# Patient Record
Sex: FEMALE
[De-identification: names, ages, dates, MRNs, and addresses within clinical notes are randomized per-mention and may not be internally consistent; named-entity substitution may affect disease eponyms.]

---

## 2018-03-18 ENCOUNTER — HOSPITAL ENCOUNTER (EMERGENCY)
Dept: HOSPITAL 31 - C.ER | Age: 33
Discharge: HOME | End: 2018-03-18
Payer: COMMERCIAL

## 2018-03-18 VITALS
RESPIRATION RATE: 18 BRPM | TEMPERATURE: 98.5 F | DIASTOLIC BLOOD PRESSURE: 62 MMHG | HEART RATE: 53 BPM | SYSTOLIC BLOOD PRESSURE: 112 MMHG

## 2018-03-18 DIAGNOSIS — O03.9: Primary | ICD-10-CM

## 2018-03-18 DIAGNOSIS — D64.9: ICD-10-CM

## 2018-03-18 LAB
BASOPHILS # BLD AUTO: 0.1 K/UL (ref 0–0.2)
BASOPHILS NFR BLD: 0.7 % (ref 0–2)
BILIRUB UR-MCNC: NEGATIVE MG/DL
EOSINOPHIL # BLD AUTO: 0.1 K/UL (ref 0–0.7)
EOSINOPHIL NFR BLD: 1.2 % (ref 0–4)
ERYTHROCYTE [DISTWIDTH] IN BLOOD BY AUTOMATED COUNT: 18.6 % (ref 11.5–14.5)
GLUCOSE UR STRIP-MCNC: NORMAL MG/DL
HCG,QUALITATIVE URINE: POSITIVE
HGB BLD-MCNC: 8.6 G/DL (ref 11–16)
LEUKOCYTE ESTERASE UR-ACNC: (no result) LEU/UL
LYMPHOCYTES # BLD AUTO: 2.6 K/UL (ref 1–4.3)
LYMPHOCYTES NFR BLD AUTO: 33.7 % (ref 20–40)
MCH RBC QN AUTO: 20.4 PG (ref 27–31)
MCHC RBC AUTO-ENTMCNC: 30.8 G/DL (ref 33–37)
MCV RBC AUTO: 66.5 FL (ref 81–99)
MONOCYTES # BLD: 0.8 K/UL (ref 0–0.8)
MONOCYTES NFR BLD: 10.2 % (ref 0–10)
NEUTROPHILS # BLD: 4.1 K/UL (ref 1.8–7)
NEUTROPHILS NFR BLD AUTO: 54.2 % (ref 50–75)
NRBC BLD AUTO-RTO: 0.1 % (ref 0–2)
PH UR STRIP: 5 [PH] (ref 5–8)
PLATELET # BLD: 336 K/UL (ref 130–400)
PMV BLD AUTO: 9.6 FL (ref 7.2–11.7)
PROT UR STRIP-MCNC: (no result) MG/DL
RBC # BLD AUTO: 4.2 MIL/UL (ref 3.8–5.2)
RBC # UR STRIP: (no result) /UL
SP GR UR STRIP: 1.02 (ref 1–1.03)
SQUAMOUS EPITHIAL: 1 /HPF (ref 0–5)
UROBILINOGEN UR-MCNC: NORMAL MG/DL (ref 0.2–1)
WBC # BLD AUTO: 7.6 K/UL (ref 4.8–10.8)

## 2018-03-18 NOTE — US
HISTORY:

vaginal bleeding



COMPARISON:

None available.



TECHNIQUE:

Transabdominal and transvaginal



FINDINGS:



UTERUS:

Measures 9.4 x 4.8 x 6.3 cm. Normal in size and appearance. No 

fibroid or other mass lesion seen.



ENDOMETRIUM:

Measures 6 mm in diameter. No intrauterine gestational sac identified 



CERVIX:

No cervical abnormality identified.



RIGHT OVARY:

Measures 1.8 x 1.4 x 1.9 cm. No solid mass. Normal flow. 



LEFT OVARY:

Measures 3.8 x 2.1 x 2.9 cm. No solid mass. Normal flow. 



FREE FLUID:

Small amount of free fluid in cul-de-sac



OTHER FINDINGS:

None. 



IMPRESSION:

No intrauterine gestation.  Small amount of free fluid in cul-de-sac 

common nonspecific.  Otherwise unremarkable examination.

## 2018-03-18 NOTE — C.PDOC
History Of Present Illness





31 yo female , LNMP 18, EGA 5w5d , no hx of ectopic pregnancy, come in 

for evaluation of sudden onset of vaginal bleeding for past 2 days associated 

with mild diffuse lower abdominal pain. Pt denies current prenatal care. Denies 

fever, chills, recent illness, sore throat, CP, SOB, dyspnea, diaphoresis, 

palpitation, V/D, back pain, UTI sx, denies vaginal irritation or discharges. 

Ambulate to ED for evaluation, not in nay apparent distress.


Time Seen by Provider: 18 12:06


Chief Complaint (Nursing): Female Genitourinary


History Per: Patient


Onset/Duration Of Symptoms: Gradual





Past Medical History


Reviewed: Historical Data, Nursing Documentation, Vital Signs


Vital Signs: 


 Last Vital Signs











Temp  98.5 F   18 15:22


 


Pulse  53 L  18 15:22


 


Resp  18   18 15:22


 


BP  112/62   18 15:22


 


Pulse Ox  100   18 15:22














- Medical History


PMH: No Chronic Diseases


Surgical History: No Surg Hx


Family History: States: Unknown Family Hx





- Social History


Hx Alcohol Use: No


Hx Substance Use: No





- Immunization History


Hx Influenza Vaccination: No





Review Of Systems


Except As Marked, All Systems Reviewed And Found Negative.


Constitutional: Negative for: Fever, Chills


ENT: Negative for: Nose Discharge, Throat Pain, Throat Swelling


Cardiovascular: Negative for: Chest Pain


Respiratory: Negative for: Cough, Shortness of Breath, Wheezing


Gastrointestinal: Positive for: Abdominal Pain.  Negative for: Nausea, Vomiting

, Diarrhea


Genitourinary: Positive for: Vaginal Bleeding.  Negative for: Dysuria, Hematuria

, Vaginal Discharge


Musculoskeletal: Negative for: Neck Pain, Back Pain


Skin: Negative for: Rash


Neurological: Negative for: Weakness, Numbness, Altered Mental Status





Physical Exam





- Physical Exam


Appears: Well, Non-toxic, No Acute Distress


Skin: Normal Color, Warm, Dry, No Rash


Head: Normacephalic


Eye(s): bilateral: PERRL


Nose: No Flaring, No Discharge


Oral Mucosa: Moist, No Drooling


Throat: No Erythema, No Drooling


Neck: Trachea Midline, Supple


Cardiovascular: Rhythm Regular


Respiratory: No Decreased Breath Sounds, No Accessory Muscle Use, No Stridor, 

No Wheezing


Gastrointestinal/Abdominal: Soft, Tenderness (mild supr), No Distention, No 

Guarding, No Rebound


Back: No CVA Tenderness


Extremity: Normal ROM, No Pedal Edema, No Deformity


Neurological/Psych: Oriented x3, Normal Speech





ED Course And Treatment





- Laboratory Results


Result Diagrams: 


 18 13:03





Urine Pregnancy POC: Positive


O2 Sat by Pulse Oximetry: 98


Pulse Ox Interpretation: Normal


Progress Note: Blood work review, H/H 8.6/27.9 r/o iron def. anemia.  beta 

quant 314.  Blood type A positive.  US results review , no IUP visualized r/o 

spontaneous .  On re-evaluation, pt is afebrile, hemodynamicaly stable.

  Non-toxic.  Tolerate Po well in ED.  Neck: Supple, (-) meningeal sign.  ENT: 

no acute findings.  Lungs: CTA B/L, BS equal B/L.  Abd: benign, (-) guarding, (-

) rebound.  back: (-) CVA tenderness.  results review and discussed with pt.  

Pt has clinical findings c/w spontaneous misscariege.  Pt advised.  ref. to ED 

in 2 days to repeat blood work, US?  Pt understand and agrees with plan.  

Return to ED if any worsening or new changes.





Disposition


Counseled Patient/Family Regarding: Studies Performed, Diagnosis, Need For 

Followup, Rx Given





- Disposition


[facility]errals[facility] 


[facility]


Disposition: HOME/ ROUTINE


Disposition Time: 14:50


Condition: STABLE


Additional Instructions: 


Follow up with GYN or return to ED ( if unable to reach GYN) in 2 days for re-

evaluation and repeat beta quant.





return to Ed if any worsening or new changes.


Instructions:  Anemia Caused by Low Iron, Adult (DC), Miscarriage (DC)


Forms:  [employer] (English)


Print Language: Kazakh





- Clinical Impression


Clinical Impression: 


 Spontaneous , Anemia

## 2018-03-20 ENCOUNTER — HOSPITAL ENCOUNTER (EMERGENCY)
Dept: HOSPITAL 31 - C.ER | Age: 33
Discharge: HOME | End: 2018-03-20
Payer: COMMERCIAL

## 2018-03-20 VITALS
TEMPERATURE: 98.5 F | OXYGEN SATURATION: 100 % | RESPIRATION RATE: 18 BRPM | DIASTOLIC BLOOD PRESSURE: 76 MMHG | SYSTOLIC BLOOD PRESSURE: 118 MMHG | HEART RATE: 63 BPM

## 2018-03-20 VITALS — OXYGEN SATURATION: 98 %

## 2018-03-20 DIAGNOSIS — O03.9: Primary | ICD-10-CM

## 2018-03-20 NOTE — C.PDOC
History Of Present Illness


31yo female, presents to ED today for a repeat beta-HCG. She was seen in this 

facility 2 days ago with vaginal bleeding in setting of 6 weeks of pregnancy; 

patient had an US performed which showed nothing in the uterus. Patient was 

diagnosed with a complete . She denies any vaginal bleeding today. 


Time Seen by Provider: 18 13:45


Chief Complaint (Nursing): Medical Clearance


History Per: Patient


History/Exam Limitations: no limitations


Onset/Duration Of Symptoms: Days





Past Medical History


Reviewed: Historical Data, Nursing Documentation, Vital Signs


Vital Signs: 


 Last Vital Signs











Temp  98.5 F   18 13:17


 


Pulse  63   18 13:17


 


Resp  18   18 13:17


 


BP  118/76   18 13:17


 


Pulse Ox  100   18 14:53














- Medical History


PMH: No Chronic Diseases


Surgical History: No Surg Hx


Family History: States: Unknown Family Hx





- Social History


Hx Alcohol Use: No


Hx Substance Use: No





- Immunization History


Hx Influenza Vaccination: No





Review Of Systems


Except As Marked, All Systems Reviewed And Found Negative.


Genitourinary: Negative for: Vaginal Bleeding





Physical Exam





- Physical Exam


Appears: Non-toxic, No Acute Distress


Skin: Normal Color


Head: Normacephalic


Eye(s): bilateral: Normal Inspection


Neck: Normal, Supple


Chest: Symmetrical


Cardiovascular: Rhythm Regular


Respiratory: Normal Breath Sounds


Gastrointestinal/Abdominal: Normal Exam, Soft, No Tenderness


Neurological/Psych: Oriented x3





ED Course And Treatment


O2 Sat by Pulse Oximetry: 100 (RA)


Pulse Ox Interpretation: Normal





Medical Decision Making


Medical Decision Making: 


Impression: Follow up regarding complete 


Plan:


-- Beta HCG levels





Patient with beta-HCG level trending down. Stable for discharge home, advised 

to follow up with OBGYN in 2-3 days. 





Disposition


Counseled Patient/Family Regarding: Studies Performed, Diagnosis





- Disposition


Disposition: HOME/ ROUTINE


Disposition Time: 14:52


Condition: STABLE


Forms:  Gen Discharge Inst Guyanese, [employer] (Guyanese)





- POA


Present On Arrival: None





- Clinical Impression


Clinical Impression: 


 Complete miscarriage








- Scribe Statement


The provider has reviewed the documentation as recorded by the Scribe (Nataly Sapp)


Provider Attestation: 


All medical record entries made by the Scribe were at my direction and 

personally dictated by me. I have reviewed the chart and agree that the record 

accurately reflects my personal performance of the history, physical exam, 

medical decision making, and the department course for this patient. I have 

also personally directed, reviewed, and agree with the discharge instructions 

and disposition.